# Patient Record
Sex: FEMALE | URBAN - METROPOLITAN AREA
[De-identification: names, ages, dates, MRNs, and addresses within clinical notes are randomized per-mention and may not be internally consistent; named-entity substitution may affect disease eponyms.]

---

## 2021-03-09 ENCOUNTER — APPOINTMENT (OUTPATIENT)
Dept: URBAN - METROPOLITAN AREA SURGERY 9 | Age: 41
Setting detail: DERMATOLOGY
End: 2021-03-09

## 2021-03-09 DIAGNOSIS — L29.8 OTHER PRURITUS: ICD-10-CM

## 2021-03-09 DIAGNOSIS — L40.1 GENERALIZED PUSTULAR PSORIASIS: ICD-10-CM

## 2021-03-09 PROBLEM — L30.9 DERMATITIS, UNSPECIFIED: Status: ACTIVE | Noted: 2021-03-09

## 2021-03-09 PROCEDURE — OTHER PRESCRIPTION MEDICATION MANAGEMENT: OTHER

## 2021-03-09 PROCEDURE — OTHER OTHER: OTHER

## 2021-03-09 PROCEDURE — OTHER INTRAMUSCULAR KENALOG: OTHER

## 2021-03-09 PROCEDURE — 99204 OFFICE O/P NEW MOD 45 MIN: CPT | Mod: 25

## 2021-03-09 PROCEDURE — OTHER PRESCRIPTION: OTHER

## 2021-03-09 PROCEDURE — 96372 THER/PROPH/DIAG INJ SC/IM: CPT

## 2021-03-09 PROCEDURE — OTHER COUNSELING: OTHER

## 2021-03-09 RX ORDER — BETAMETHASONE DIPROPIONATE 0.5 MG/G
CREAM, AUGMENTED TOPICAL
Qty: 1 | Refills: 1 | Status: ERX | COMMUNITY
Start: 2021-03-09

## 2021-03-09 ASSESSMENT — LOCATION DETAILED DESCRIPTION DERM
LOCATION DETAILED: RIGHT RADIAL DORSAL HAND
LOCATION DETAILED: RIGHT BUTTOCK
LOCATION DETAILED: LEFT RADIAL DORSAL HAND

## 2021-03-09 ASSESSMENT — LOCATION SIMPLE DESCRIPTION DERM
LOCATION SIMPLE: RIGHT HAND
LOCATION SIMPLE: LEFT HAND
LOCATION SIMPLE: RIGHT BUTTOCK

## 2021-03-09 ASSESSMENT — LOCATION ZONE DERM
LOCATION ZONE: HAND
LOCATION ZONE: TRUNK

## 2021-03-09 NOTE — HPI: RASH
Is This A New Presentation, Or A Follow-Up?: Rash
Additional History: Pt states that she got a tattoo in December 2020, pt states that that the rash began in January 2021, the rash began on the right arm directly below where the tattoo is. Pt states that she went to the emergency department and they Dx’d her rash as dyshidrotic eczema, and gave her prednisone taper. The pt is now on her 3rd prednisone taper with 60mg once daily being the highest dose. Pt states today is a decent day for her rash.

## 2021-03-09 NOTE — PROCEDURE: OTHER
Note Text (......Xxx Chief Complaint.): This diagnosis correlates with the
Detail Level: Simple
Render Risk Assessment In Note?: no
Other (Free Text): All finger nails have been flaking/splitting for many years without any rash on her fingers/hands during that time. \\nDrives a truck, same position since 2005, no change.\\nFeet without rash or nail c/o.  No rash elsewhere, denies fhx.\\nDenies joint pain.\\nConsider bx at f/u to help clarify.  Crops of pustules L palm. \\nConsider Otezla.

## 2021-03-09 NOTE — PROCEDURE: PRESCRIPTION MEDICATION MANAGEMENT
Initiate Treatment: augmented betamethasone under occlusion at night.\\nIMK.
Render In Strict Bullet Format?: No
Plan: See other
Detail Level: Zone

## 2021-03-31 ENCOUNTER — APPOINTMENT (OUTPATIENT)
Dept: URBAN - METROPOLITAN AREA SURGERY 9 | Age: 41
Setting detail: DERMATOLOGY
End: 2021-04-01

## 2021-03-31 DIAGNOSIS — L40.0 PSORIASIS VULGARIS: ICD-10-CM

## 2021-03-31 PROCEDURE — OTHER OTHER: OTHER

## 2021-03-31 PROCEDURE — OTHER PRESCRIPTION MEDICATION MANAGEMENT: OTHER

## 2021-03-31 PROCEDURE — 99214 OFFICE O/P EST MOD 30 MIN: CPT

## 2021-03-31 PROCEDURE — OTHER COUNSELING: OTHER

## 2021-03-31 PROCEDURE — OTHER PRESCRIPTION: OTHER

## 2021-03-31 RX ORDER — APREMILAST 30 MG/1
TABLET, FILM COATED ORAL
Qty: 60 | Refills: 2 | Status: ERX | COMMUNITY
Start: 2021-03-31

## 2021-03-31 RX ORDER — APREMILAST 10-20-30MG
KIT ORAL
Qty: 1 | Refills: 0 | Status: ERX | COMMUNITY
Start: 2021-03-31

## 2021-03-31 ASSESSMENT — LOCATION SIMPLE DESCRIPTION DERM
LOCATION SIMPLE: LEFT HAND
LOCATION SIMPLE: RIGHT HAND
LOCATION SIMPLE: LEFT FOREARM
LOCATION SIMPLE: RIGHT FOREARM

## 2021-03-31 ASSESSMENT — LOCATION DETAILED DESCRIPTION DERM
LOCATION DETAILED: LEFT RADIAL DORSAL HAND
LOCATION DETAILED: RIGHT DISTAL DORSAL FOREARM
LOCATION DETAILED: RIGHT RADIAL DORSAL HAND
LOCATION DETAILED: LEFT DISTAL DORSAL FOREARM

## 2021-03-31 ASSESSMENT — LOCATION ZONE DERM
LOCATION ZONE: HAND
LOCATION ZONE: ARM

## 2021-03-31 NOTE — PROCEDURE: OTHER
Note Text (......Xxx Chief Complaint.): This diagnosis correlates with the
Detail Level: Simple
Render Risk Assessment In Note?: no
Other (Free Text): She is now exfoliating today.  Starting to develop nonspecific pink patches on dorsal forearms.  I do not believe that a biopsy would be diagnostic.\\nFeet without rash or nail c/o.  No rash elsewhere, denies fhx.\\nContinues to deny joint pain.\\n
Other (Free Text): Has failed augmented betamethasone and IMK60.

## 2021-03-31 NOTE — PROCEDURE: PRESCRIPTION MEDICATION MANAGEMENT
Continue Regimen: Augmented betamethasone cream twice daily M-F and take the weekends off. Apply with occlusion at night.

## 2021-03-31 NOTE — PROCEDURE: PRESCRIPTION MEDICATION MANAGEMENT
Plan: Will send Otezla to [facility] for benefit investigation Plan: Will send Otezla to [facility] for benefit investigation

## 2021-06-02 ENCOUNTER — APPOINTMENT (OUTPATIENT)
Dept: URBAN - METROPOLITAN AREA SURGERY 9 | Age: 41
Setting detail: DERMATOLOGY
End: 2021-06-02

## 2021-06-02 DIAGNOSIS — L40.1 GENERALIZED PUSTULAR PSORIASIS: ICD-10-CM

## 2021-06-02 DIAGNOSIS — B00.1 HERPESVIRAL VESICULAR DERMATITIS: ICD-10-CM

## 2021-06-02 PROBLEM — L30.9 DERMATITIS, UNSPECIFIED: Status: ACTIVE | Noted: 2021-06-02

## 2021-06-02 PROBLEM — L08.9 LOCAL INFECTION OF THE SKIN AND SUBCUTANEOUS TISSUE, UNSPECIFIED: Status: ACTIVE | Noted: 2021-06-02

## 2021-06-02 PROCEDURE — 11104 PUNCH BX SKIN SINGLE LESION: CPT

## 2021-06-02 PROCEDURE — OTHER ORDER TESTS: OTHER

## 2021-06-02 PROCEDURE — OTHER MIPS QUALITY: OTHER

## 2021-06-02 PROCEDURE — OTHER COUNSELING: OTHER

## 2021-06-02 PROCEDURE — OTHER PRESCRIPTION: OTHER

## 2021-06-02 PROCEDURE — 99213 OFFICE O/P EST LOW 20 MIN: CPT | Mod: 25

## 2021-06-02 PROCEDURE — OTHER OTHER: OTHER

## 2021-06-02 PROCEDURE — OTHER BIOPSY BY PUNCH METHOD: OTHER

## 2021-06-02 PROCEDURE — OTHER PRESCRIPTION MEDICATION MANAGEMENT: OTHER

## 2021-06-02 RX ORDER — VALACYCLOVIR HYDROCHLORIDE 1 G/1
TABLET, FILM COATED ORAL
Qty: 21 | Refills: 0 | Status: ERX | COMMUNITY
Start: 2021-06-02

## 2021-06-02 ASSESSMENT — LOCATION SIMPLE DESCRIPTION DERM
LOCATION SIMPLE: RIGHT LIP
LOCATION SIMPLE: RIGHT CHEEK
LOCATION SIMPLE: RIGHT HAND

## 2021-06-02 ASSESSMENT — LOCATION DETAILED DESCRIPTION DERM
LOCATION DETAILED: RIGHT THENAR EMINENCE
LOCATION DETAILED: RIGHT INFERIOR MEDIAL MALAR CHEEK
LOCATION DETAILED: RIGHT UPPER CUTANEOUS LIP

## 2021-06-02 ASSESSMENT — LOCATION ZONE DERM
LOCATION ZONE: FACE
LOCATION ZONE: HAND
LOCATION ZONE: LIP

## 2021-06-02 NOTE — PROCEDURE: ORDER TESTS
Expected Date Of Service: 06/02/2021
Billing Type: Third-Party Bill
Bill For Surgical Tray: no
Performing Laboratory: 0

## 2021-06-02 NOTE — PROCEDURE: OTHER
Other (Free Text): She is using TCS 2-3 days/week.\\nTolerating Otezla well and I believe she is reponding to this. She is having issues with insurance - [employer] has only been charging her co-pay card and not her insurance.  I have told her that she will need to call [employer] to dispute this.  Encouraged to call with any concerns. Other (Free Text): She is using TCS 2-3 days/week.\\nTolerating Otezla well and I believe she is reponding to this. She is having issues with insurance - [employer] has only been charging her co-pay card and not her insurance.  I have told her that she will need to call [employer] to dispute this.  Encouraged to call with any concerns.

## 2021-06-17 ENCOUNTER — APPOINTMENT (OUTPATIENT)
Dept: URBAN - METROPOLITAN AREA SURGERY 9 | Age: 41
Setting detail: DERMATOLOGY
End: 2021-06-17

## 2021-06-17 DIAGNOSIS — Z48.02 ENCOUNTER FOR REMOVAL OF SUTURES: ICD-10-CM

## 2021-06-17 DIAGNOSIS — L40.1 GENERALIZED PUSTULAR PSORIASIS: ICD-10-CM

## 2021-06-17 DIAGNOSIS — B00.1 HERPESVIRAL VESICULAR DERMATITIS: ICD-10-CM

## 2021-06-17 PROBLEM — L30.9 DERMATITIS, UNSPECIFIED: Status: ACTIVE | Noted: 2021-06-17

## 2021-06-17 PROCEDURE — 99024 POSTOP FOLLOW-UP VISIT: CPT

## 2021-06-17 PROCEDURE — OTHER PRESCRIPTION MEDICATION MANAGEMENT: OTHER

## 2021-06-17 PROCEDURE — OTHER SUTURE REMOVAL (GLOBAL PERIOD): OTHER

## 2021-06-17 PROCEDURE — OTHER PRESCRIPTION: OTHER

## 2021-06-17 PROCEDURE — 99214 OFFICE O/P EST MOD 30 MIN: CPT

## 2021-06-17 PROCEDURE — OTHER COUNSELING: OTHER

## 2021-06-17 RX ORDER — APREMILAST 30 MG/1
TABLET, FILM COATED ORAL
Qty: 60 | Refills: 2 | Status: ERX | COMMUNITY
Start: 2021-06-17

## 2021-06-17 RX ORDER — VALACYCLOVIR HYDROCHLORIDE 1 G/1
TABLET, FILM COATED ORAL Q12 HOURS
Qty: 16 | Refills: 1 | Status: ERX | COMMUNITY
Start: 2021-06-17

## 2021-06-17 RX ORDER — BETAMETHASONE DIPROPIONATE 0.5 MG/G
CREAM, AUGMENTED TOPICAL
Qty: 1 | Refills: 3 | Status: ERX | COMMUNITY
Start: 2021-06-17

## 2021-06-17 ASSESSMENT — LOCATION DETAILED DESCRIPTION DERM
LOCATION DETAILED: LEFT ULNAR DORSAL HAND
LOCATION DETAILED: RIGHT RADIAL DORSAL HAND
LOCATION DETAILED: RIGHT THENAR EMINENCE

## 2021-06-17 ASSESSMENT — LOCATION ZONE DERM: LOCATION ZONE: HAND

## 2021-06-17 ASSESSMENT — LOCATION SIMPLE DESCRIPTION DERM
LOCATION SIMPLE: RIGHT HAND
LOCATION SIMPLE: LEFT HAND

## 2021-06-17 NOTE — PROCEDURE: SUTURE REMOVAL (GLOBAL PERIOD)
Detail Level: Detailed
Add 05391 Cpt? (Important Note: In 2017 The Use Of 40818 Is Being Tracked By Cms To Determine Future Global Period Reimbursement For Global Periods): yes

## 2021-06-17 NOTE — PROCEDURE: PRESCRIPTION MEDICATION MANAGEMENT
Continue Regimen: TCS prn
Detail Level: Zone
Modify Regimen: Otezla once daily
Plan: We will decrease her Otezla to qd to see if this helps with HA.  Call if these worsen.  Otherwise tolerating well, denies depression. \\nHer hands are improving.
Render In Strict Bullet Format?: No
Plan: Reviewed flaring dosing
Continue Regimen: Valtrex